# Patient Record
Sex: FEMALE | Employment: OTHER | URBAN - METROPOLITAN AREA
[De-identification: names, ages, dates, MRNs, and addresses within clinical notes are randomized per-mention and may not be internally consistent; named-entity substitution may affect disease eponyms.]

---

## 2022-02-09 ENCOUNTER — TRANSFERRED RECORDS (OUTPATIENT)
Dept: HEALTH INFORMATION MANAGEMENT | Facility: CLINIC | Age: 62
End: 2022-02-09
Payer: COMMERCIAL

## 2022-02-11 ENCOUNTER — TRANSCRIBE ORDERS (OUTPATIENT)
Dept: OTHER | Age: 62
End: 2022-02-11

## 2022-02-11 DIAGNOSIS — D49.2 SKIN NEOPLASM: Primary | ICD-10-CM

## 2022-03-02 ENCOUNTER — OFFICE VISIT (OUTPATIENT)
Dept: OPHTHALMOLOGY | Facility: CLINIC | Age: 62
End: 2022-03-02
Payer: COMMERCIAL

## 2022-03-02 DIAGNOSIS — H02.9 EYELID LESION: Primary | ICD-10-CM

## 2022-03-02 DIAGNOSIS — H02.125 MECHANICAL ECTROPION OF LEFT LOWER EYELID: ICD-10-CM

## 2022-03-02 PROCEDURE — 99204 OFFICE O/P NEW MOD 45 MIN: CPT | Performed by: OPHTHALMOLOGY

## 2022-03-02 PROCEDURE — 92285 EXTERNAL OCULAR PHOTOGRAPHY: CPT | Performed by: OPHTHALMOLOGY

## 2022-03-02 RX ORDER — ASPIRIN 325 MG
1 TABLET ORAL
COMMUNITY

## 2022-03-02 RX ORDER — ATORVASTATIN CALCIUM 20 MG/1
20 TABLET, FILM COATED ORAL DAILY
COMMUNITY
Start: 2021-12-16

## 2022-03-02 RX ORDER — MULTIPLE VITAMINS W/ MINERALS TAB 9MG-400MCG
1 TAB ORAL
COMMUNITY

## 2022-03-02 RX ORDER — PRAMIPEXOLE DIHYDROCHLORIDE 1 MG/1
1 TABLET ORAL AT BEDTIME
COMMUNITY
Start: 2021-12-19

## 2022-03-02 RX ORDER — MULTIVITAMIN WITH IRON
250 TABLET ORAL
COMMUNITY

## 2022-03-02 RX ORDER — LEVOTHYROXINE SODIUM 175 UG/1
175 TABLET ORAL
COMMUNITY
Start: 2021-11-01

## 2022-03-02 ASSESSMENT — CONF VISUAL FIELD
METHOD: COUNTING FINGERS
OD_NORMAL: 1
OS_NORMAL: 1

## 2022-03-02 ASSESSMENT — TONOMETRY
IOP_METHOD: ICARE
OD_IOP_MMHG: 12
OS_IOP_MMHG: 13

## 2022-03-02 ASSESSMENT — SLIT LAMP EXAM - LIDS: COMMENTS: NORMAL

## 2022-03-02 ASSESSMENT — VISUAL ACUITY
OS_CC: 20/25
METHOD: SNELLEN - LINEAR
OD_CC: 20/20-
CORRECTION_TYPE: GLASSES

## 2022-03-02 NOTE — LETTER
3/2/2022         RE:  :  MRN: Ayana Joe  1960  [id number]     Dear Dr. Dutta,    Thank you for asking me to see your patient, Ayana Joe, for an oculoplastic   consultation.  My assessment and plan are below.  For further details, please see my attached clinic note.      Chief Complaint(s) and History of Present Illness(es)     Lesion On Left Lower Lid     Laterality: left lower lid    Onset: months ago    Course: rapidly worsening    Associated symptoms: Negative for bleeding and discharge    Pain scale: 0/10              Comments     Pt here for evaluation of lesion on her left lower lid.    She had one before and it was removed five years ago at the same time of   her cataract surgery (done by Dr. Amin).  She is unsure if it was   sent to pathology.  (I called [facility] in [facility] and they have   the surgical report from the removal but it does not mention anything   about being sent to pathology and there were no reports from path/lab that   were sent after surgery).      She noticed it growing back this fall - and this time it is growing back   very rapidly. She had an eye exam in 2021 and she doesn't   think that the lesion was present at that time.  This time she has two   lumps that are right next to each other, one bigger than the other.  She   does not have pain unless she pushes hard on the lumps.  She states they   are very hard and they do move under the skin if she pushes them around.    She doesn't think the lesion has changed in color.  There has been no   discharge from the lesion.    No hx of skin cancers or any other types of cancers    ENLSON Mayers 12:50 PM 2022       Assessment & Plan     Ayana Joe is a 61 year old female with the following diagnoses:   Encounter Diagnoses   Name Primary?     Eyelid lesion Yes     Mechanical ectropion of left lower eyelid      Favor benign cystic lesion but do need to rule out basal cell. Given  it is cystic, will need to excise rather than incisional biopsy, and reconstruct with local advancement flaps, possibility needs lower eyelid canthopexy.     Discussed r/b/a - including finding out it is malignancy and need for further procedures, lower eyelid retraction or ectropion, scar, recurrence.     Will need her to hold her aspirin, then return for procedure.     Patient disposition:   No follow-ups on file.         Again, thank you for allowing me to participate in the care of your patient.      Sincerely,    Do Verduzco MD  Department of Ophthalmology and Visual Neurosciences  [facility]    CC: Megan Dutta OD  [address] [address]  Via Fax: [phone]

## 2022-03-02 NOTE — PROGRESS NOTES
Chief Complaint(s) and History of Present Illness(es)     Lesion On Left Lower Lid     Laterality: left lower lid    Onset: months ago    Course: rapidly worsening    Associated symptoms: Negative for bleeding and discharge    Pain scale: 0/10              Comments     Pt here for evaluation of lesion on her left lower lid.    She had one before and it was removed five years ago at the same time of   her cataract surgery (done by Dr. Amin).  She is unsure if it was   sent to pathology.  (I called [facility] in [facility] and they have   the surgical report from the removal but it does not mention anything   about being sent to pathology and there were no reports from path/lab that   were sent after surgery).      She noticed it growing back this fall - and this time it is growing back   very rapidly. She had an eye exam in September of 2021 and she doesn't   think that the lesion was present at that time.  This time she has two   lumps that are right next to each other, one bigger than the other.  She   does not have pain unless she pushes hard on the lumps.  She states they   are very hard and they do move under the skin if she pushes them around.    She doesn't think the lesion has changed in color.  There has been no   discharge from the lesion.    No hx of skin cancers or any other types of cancers    Lillie Beaver, COT 12:50 PM 03/02/2022       Assessment & Plan     Ayana Joe is a 61 year old female with the following diagnoses:   Encounter Diagnoses   Name Primary?     Eyelid lesion Yes     Mechanical ectropion of left lower eyelid      Favor benign cystic lesion but do need to rule out basal cell. Given it is cystic, will need to excise rather than incisional biopsy, and reconstruct with local advancement flaps, possibility needs lower eyelid canthopexy.     Discussed r/b/a - including finding out it is malignancy and need for further procedures, lower eyelid retraction or ectropion,  scar, recurrence.     Will need her to hold her aspirin, then return for procedure.     Patient disposition:   No follow-ups on file.        Attending Physician Attestation: Complete documentation of historical and exam elements from today's encounter can be found in the full encounter summary report (not reduplicated in this progress note). I personally obtained the chief complaint(s) and history of present illness. I confirmed and edited as necessary the review of systems, past medical/surgical history, family history, social history, and examination findings as documented by others; and I examined the patient myself. I personally reviewed the relevant tests, images, and reports as documented above. I formulated and edited as necessary the assessment and plan and discussed the findings and management plan with the patient.  -Do Verduzco MD

## 2022-03-30 ENCOUNTER — OFFICE VISIT (OUTPATIENT)
Dept: OPHTHALMOLOGY | Facility: CLINIC | Age: 62
End: 2022-03-30
Payer: COMMERCIAL

## 2022-03-30 DIAGNOSIS — H02.9 LESION OF LEFT LOWER EYELID: Primary | ICD-10-CM

## 2022-03-30 PROCEDURE — 88305 TISSUE EXAM BY PATHOLOGIST: CPT | Performed by: OPHTHALMOLOGY

## 2022-03-30 PROCEDURE — 67810 INCAL BX EYELID SKN LID MRGN: CPT | Mod: E2 | Performed by: OPHTHALMOLOGY

## 2022-03-30 RX ORDER — ERYTHROMYCIN 5 MG/G
OINTMENT OPHTHALMIC ONCE
Status: COMPLETED | OUTPATIENT
Start: 2022-03-30 | End: 2022-03-30

## 2022-03-30 RX ADMIN — ERYTHROMYCIN: 5 OINTMENT OPHTHALMIC at 12:07

## 2022-03-30 NOTE — PROGRESS NOTES
Preoperative diagnosis: Left lower eyelid cystic mass  Postoperative diagnosis: Left lower eyelid cystic mass  Procedure performed: Left lower eyelid excision of lesion and reconstruction.  Surgeon: Do Verduzco MD   Assistant: Cassia Zhao MD   Anesthesia: 1% lidocaine with epinephrine  Complications: None  Specimen: Left lower eyelid mass    Indication for procedure:  Peg presented with a gradually enlarging mass of the left lower eyelid and anterior orbit.  She noted it had been previously excised but does not recall it being sent to pathology.  Given it was enlarging and atypical we discussed risk benefits alternatives the post procedure and she elected to proceed.    Description of procedure:  She was brought to the minor procedure room and placed supine on the table.  The area was infiltrated with local anesthetic as above.  She was prepped and draped in the typical sterile fashion for oculoplastic surgery.  Attention was directed to the left side.  A 15 blade was used to incise above the lesion and dissection was carried out in the subcutaneous plane.  The thin-walled cystic mass was encountered.  It was multilobulated it was dissected out sharply.  I was able to keep the entire cyst wall intact along with all of its lobulations.  Hemostasis was obtained with direct pressure and thermal cautery.  Once the cystic lesion was excised there was quite a bit of redundant skin from chronic distention thus a small strip of skin was excised.  The wound measured about 15 mm x 10 mm.  Skin closure was achieved with interrupted and running 6-0 plain gut sutures.  She tolerated the procedure well.  Erythromycin ophthalmic ointment was applied.  She left in stable condition.    I was present for the entire procedure. Do Verduzco MD  This report was dictated using Dragon voice recognition software.

## 2022-03-30 NOTE — PATIENT INSTRUCTIONS
Ice 10 minutes every hour for the first 48 hours  Warm compress morning and night and as needed during the day for the next 48 hours  Erythromycin ointment to sutures 3 times a day for 5-7 days

## 2022-03-30 NOTE — LETTER
"April 1, 2022      Ayana Weiman  [address]  [place] [address]        Dear Ayana,    Please see below for your test results. The cyst is benign (not worrisome).     Resulted Orders   Surgical Pathology Exam   Result Value Ref Range    Case Report       Surgical Pathology Report                         Case: [id number]                                  Authorizing Provider:  Do Verduzco MD      Collected:           03/30/2022 12:07 PM          Ordering Location:     [facility]   Received:            03/30/2022 03:07 PM                                 [place]                                                                  Pathologist:           Alyce Crespo MD                                                         Specimen:    Eyelid, Lower, Left                                                                        Final Diagnosis       Lower eyelid, left, lesion excision: Multiple hidrocystomas, 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       Clinical Information       The patient is a 62 year old female with a lesion of the left lower eyelid. It was previously excised and has recurred. She undergoes lesion excision on the left.      Gross Description       A(A). Eyelid, Lower, Left, :  The specimen is received in formalin with proper patient identification, labeled \"eyelid lower left\".  The specimen consists of a 1.4 x 0.9 x 0.5 cm tan-brown cystic lesion.  1 surface is roughened, and the opposing is smooth.  The roughened surface is inked blue and the specimen is sectioned to demonstrate a smooth lined cystic cavity filled with clear fluid.  The specimen is submitted entirely in cassette A1.       Microscopic Description       The tissue consists of collagenous soft tissue and skeletal muscle with multiple large, irregular, cystic spaces. The cysts are lined by a bilayer of cuboidal epithelium. The lumen is empty.       Performing Labs       The technical component of this testing was completed at " [facility]      Case Images         If you have any questions, please call the clinic to make an appointment.    Sincerely,    Do Verduzco MD  Ophthalmic Plastic & Reconstructive Surgery  Department of Ophthalmology & Visual Neurosciences    [facility]

## 2022-04-01 LAB
PATH REPORT.COMMENTS IMP SPEC: NORMAL
PATH REPORT.COMMENTS IMP SPEC: NORMAL
PATH REPORT.FINAL DX SPEC: NORMAL
PATH REPORT.GROSS SPEC: NORMAL
PATH REPORT.MICROSCOPIC SPEC OTHER STN: NORMAL
PATH REPORT.RELEVANT HX SPEC: NORMAL
PHOTO IMAGE: NORMAL